# Patient Record
Sex: MALE | Race: OTHER | Employment: FULL TIME | URBAN - METROPOLITAN AREA
[De-identification: names, ages, dates, MRNs, and addresses within clinical notes are randomized per-mention and may not be internally consistent; named-entity substitution may affect disease eponyms.]

---

## 2024-03-07 ENCOUNTER — HOSPITAL ENCOUNTER (EMERGENCY)
Facility: HOSPITAL | Age: 24
Discharge: HOME | End: 2024-03-07
Attending: EMERGENCY MEDICINE

## 2024-03-07 VITALS
DIASTOLIC BLOOD PRESSURE: 74 MMHG | RESPIRATION RATE: 14 BRPM | HEART RATE: 69 BPM | TEMPERATURE: 99.8 F | SYSTOLIC BLOOD PRESSURE: 113 MMHG | OXYGEN SATURATION: 99 %

## 2024-03-07 DIAGNOSIS — F41.9 ANXIETY: Primary | ICD-10-CM

## 2024-03-07 DIAGNOSIS — R07.9 CHEST PAIN: ICD-10-CM

## 2024-03-07 LAB
ATRIAL RATE: 79 BPM
P AXIS: 85 DEGREES
PR INTERVAL: 152 MS
QRS AXIS: 71 DEGREES
QRSD INTERVAL: 90 MS
QT INTERVAL: 364 MS
QTC INTERVAL: 417 MS
T WAVE AXIS: 17 DEGREES
VENTRICULAR RATE: 79 BPM

## 2024-03-07 PROCEDURE — 93010 ELECTROCARDIOGRAM REPORT: CPT | Performed by: INTERNAL MEDICINE

## 2024-03-07 PROCEDURE — 93005 ELECTROCARDIOGRAM TRACING: CPT

## 2024-03-07 PROCEDURE — 99284 EMERGENCY DEPT VISIT MOD MDM: CPT

## 2024-03-07 RX ORDER — DIAZEPAM 5 MG/1
5 TABLET ORAL ONCE
Status: COMPLETED | OUTPATIENT
Start: 2024-03-07 | End: 2024-03-07

## 2024-03-07 RX ORDER — HYDROXYZINE HYDROCHLORIDE 25 MG/1
25 TABLET, FILM COATED ORAL EVERY 6 HOURS
Qty: 12 TABLET | Refills: 0 | Status: SHIPPED | OUTPATIENT
Start: 2024-03-07

## 2024-03-07 RX ADMIN — DIAZEPAM 5 MG: 5 TABLET ORAL at 19:40

## 2024-03-07 NOTE — Clinical Note
Babar Jacques was seen and treated in our emergency department on 3/7/2024.                Diagnosis:     Babar  .    He may return on this date: 03/08/2024         If you have any questions or concerns, please don't hesitate to call.      Jaun Hinkle MD    ______________________________           _______________          _______________  Hospital Representative                              Date                                Time

## 2024-03-08 NOTE — ED PROVIDER NOTES
History  Chief Complaint   Patient presents with    Chest Pain     3 days mid sternal no radiation.      This is a 23-year-old otherwise healthy male who is presenting today due to concern for episodes of chest pain.  Patient works as a  and states that last night he was having intermittent episodes of left-sided and sternal chest pain that would last for few seconds to a few minutes.  He denies any associated symptoms such as shortness of breath, nausea, or radiation of the pain into his left neck, left arm, or into his back.  He did not note any positional nature of the pain.  And denies any recent infections or fevers, chills, cough, congestion.  He denies any prior history of heart disease or pulmonary diagnoses.  Patient is an every day smoker.  No family history of early onset cardiac disease or sudden cardiac death.          None       History reviewed. No pertinent past medical history.    History reviewed. No pertinent surgical history.    History reviewed. No pertinent family history.  I have reviewed and agree with the history as documented.    E-Cigarette/Vaping     E-Cigarette/Vaping Substances     Social History     Tobacco Use    Smoking status: Every Day    Smokeless tobacco: Never        Review of Systems   Constitutional:  Negative for chills, fatigue and fever.   HENT:  Negative for congestion and sore throat.    Eyes:  Negative for pain and visual disturbance.   Respiratory:  Negative for cough, chest tightness and shortness of breath.    Cardiovascular:  Positive for chest pain. Negative for palpitations.   Gastrointestinal:  Negative for abdominal pain, blood in stool, constipation, diarrhea, nausea and vomiting.   Genitourinary:  Negative for dysuria, flank pain and hematuria.   Musculoskeletal:  Negative for arthralgias, back pain and neck pain.   Skin:  Negative for color change and rash.   Neurological:  Negative for dizziness, syncope and light-headedness.   Hematological:   Negative for adenopathy. Does not bruise/bleed easily.   All other systems reviewed and are negative.      Physical Exam  ED Triage Vitals   Temperature Pulse Respirations Blood Pressure SpO2   03/07/24 1850 03/07/24 1850 03/07/24 1850 03/07/24 1851 03/07/24 1850   99.8 °F (37.7 °C) 87 20 114/60 100 %      Temp Source Heart Rate Source Patient Position - Orthostatic VS BP Location FiO2 (%)   03/07/24 1850 03/07/24 1850 03/07/24 1915 03/07/24 1915 --   Temporal Monitor Lying Right arm       Pain Score       03/07/24 1850       5             Orthostatic Vital Signs  Vitals:    03/07/24 1850 03/07/24 1851 03/07/24 1915   BP:  114/60 113/74   Pulse: 87  69   Patient Position - Orthostatic VS:   Lying       Physical Exam  Vitals and nursing note reviewed.   Constitutional:       General: He is not in acute distress.     Appearance: He is well-developed. He is not toxic-appearing or diaphoretic.   HENT:      Head: Normocephalic and atraumatic.      Right Ear: External ear normal.      Left Ear: External ear normal.      Nose: Nose normal. No congestion or rhinorrhea.      Mouth/Throat:      Mouth: Mucous membranes are moist.      Pharynx: No oropharyngeal exudate or posterior oropharyngeal erythema.   Eyes:      General: No scleral icterus.     Extraocular Movements: Extraocular movements intact.      Conjunctiva/sclera: Conjunctivae normal.      Pupils: Pupils are equal, round, and reactive to light.   Cardiovascular:      Rate and Rhythm: Normal rate and regular rhythm.      Pulses: Normal pulses.      Heart sounds: No murmur heard.  Pulmonary:      Effort: Pulmonary effort is normal. No respiratory distress.      Breath sounds: Normal breath sounds. No wheezing or rales.   Abdominal:      Palpations: Abdomen is soft. There is no mass.      Tenderness: There is no abdominal tenderness. There is no right CVA tenderness, left CVA tenderness or guarding.      Hernia: No hernia is present.   Musculoskeletal:          General: No swelling. Normal range of motion.      Cervical back: Normal range of motion and neck supple.      Right lower leg: No edema.      Left lower leg: No edema.   Skin:     General: Skin is warm and dry.      Capillary Refill: Capillary refill takes less than 2 seconds.   Neurological:      General: No focal deficit present.      Mental Status: He is alert and oriented to person, place, and time.   Psychiatric:         Mood and Affect: Mood normal.         Behavior: Behavior normal.         ED Medications  Medications   diazepam (VALIUM) tablet 5 mg (5 mg Oral Given 3/7/24 1940)       Diagnostic Studies  Results Reviewed       None                   No orders to display         Procedures  Procedures    ECG interpreted by me.  Date: 3/7/2024.  Time: 1853.  Rate: 79 bpm.  Axis: Normal.  Rhythm: Regular.  There are no ST elevations or depressions evident, no repolarization abnormalities identified.  Interpretation: This is a normal ECG without ischemic concerns noted.     ED Course                                       Medical Decision Making  Medical complexity: This patient is 23 years old, is a smoker, however is otherwise healthy reportedly.  He has been experiencing some intermittent twinges of pain over the past few days.  Very low suspicion for this being anything cardiac related however will screen with an ECG to make sure it is within normal limits.  If normal, no further workup necessary for cardiac rule out which is why patient is here today.  Unclear etiology of patient's pain, may be musculoskeletal, but very low (though not 0) chance of it being arrhythmia or ACS.    Reassessment/disposition: ECG is within normal limits.  Patient will be discharged with PCP referral and information for follow-up.  However patient did begin to feel some significant anxiety after initial assessment.  I did treat the patient with a dose of Valium here in the emergency department and provided a referral for PCP and a  short-term prescription for hydroxyzine to be used at home as needed.    Risk  Prescription drug management.          Disposition  Final diagnoses:   Anxiety   Chest pain     Time reflects when diagnosis was documented in both MDM as applicable and the Disposition within this note       Time User Action Codes Description Comment    3/7/2024  7:36 PM Jaun Hinkle Add [F41.9] Anxiety     3/7/2024  7:37 PM Jaun Hinkle Add [R07.9] Chest pain           ED Disposition       ED Disposition   Discharge    Condition   Stable    Date/Time   Thu Mar 7, 2024  7:37 PM    Comment   Babar Jacques discharge to home/self care.                   Follow-up Information       Follow up With Specialties Details Why Contact Info Additional Information    [facility] Emergency Department Emergency Medicine Go to  If symptoms worsen, As needed [address] [facility] [address] [phone] [facility] Emergency Department, [address], [place], [address] [phone]    [facility] Medicine  Call to setup appt with PCP [address] [facility] [address] [phone] [facility], [address] [place] [address], [address] [phone]            Discharge Medication List as of 3/7/2024  7:38 PM        START taking these medications    Details   hydrOXYzine HCL (ATARAX) 25 mg tablet Take 1 tablet (25 mg total) by mouth every 6 (six) hours, Starting Thu 3/7/2024, Print               PDMP Review       None             ED Provider  Attending physically available and evaluated Babar Jacques. I managed the patient along with the ED Attending.    Electronically Signed by           Jaun Hinkle MD  03/07/24 4055

## 2024-03-10 LAB
ATRIAL RATE: 69 BPM
P AXIS: 76 DEGREES
PR INTERVAL: 152 MS
QRS AXIS: 82 DEGREES
QRSD INTERVAL: 88 MS
QT INTERVAL: 380 MS
QTC INTERVAL: 407 MS
T WAVE AXIS: 64 DEGREES
VENTRICULAR RATE: 69 BPM

## 2024-03-10 PROCEDURE — 93010 ELECTROCARDIOGRAM REPORT: CPT | Performed by: INTERNAL MEDICINE

## 2024-06-07 ENCOUNTER — HOSPITAL ENCOUNTER (EMERGENCY)
Facility: HOSPITAL | Age: 24
Discharge: HOME | End: 2024-06-07
Attending: EMERGENCY MEDICINE

## 2024-06-07 VITALS
HEART RATE: 75 BPM | OXYGEN SATURATION: 97 % | DIASTOLIC BLOOD PRESSURE: 78 MMHG | TEMPERATURE: 98.2 F | RESPIRATION RATE: 16 BRPM | SYSTOLIC BLOOD PRESSURE: 128 MMHG

## 2024-06-07 DIAGNOSIS — R07.89 ATYPICAL CHEST PAIN: Primary | ICD-10-CM

## 2024-06-07 LAB
ATRIAL RATE: 76 BPM
P AXIS: 68 DEGREES
PR INTERVAL: 146 MS
QRS AXIS: 71 DEGREES
QRSD INTERVAL: 86 MS
QT INTERVAL: 358 MS
QTC INTERVAL: 402 MS
T WAVE AXIS: 35 DEGREES
VENTRICULAR RATE: 76 BPM

## 2024-06-07 PROCEDURE — 99284 EMERGENCY DEPT VISIT MOD MDM: CPT | Performed by: EMERGENCY MEDICINE

## 2024-06-07 PROCEDURE — 93005 ELECTROCARDIOGRAM TRACING: CPT

## 2024-06-07 PROCEDURE — 93010 ELECTROCARDIOGRAM REPORT: CPT | Performed by: INTERNAL MEDICINE

## 2024-06-07 NOTE — DISCHARGE INSTRUCTIONS
If you have new, worse chest pain, if you are experiencing shortness of breath, if you have any new severe symptoms please come back to the emergency department for evaluation.  Otherwise please set up an appointment with a primary care physician.     As we discussed, for your pain you use Tylenol/Motrin, heat, and try to rest your left arm when able.

## 2024-06-07 NOTE — Clinical Note
Babar Jacques was seen and treated in our emergency department on 6/7/2024.                Diagnosis:     Babar  is off the rest of the shift today.    He may return on this date:          If you have any questions or concerns, please don't hesitate to call.      Jaun Hinkle MD    ______________________________           _______________          _______________  Hospital Representative                              Date                                Time

## 2024-06-07 NOTE — ED PROVIDER NOTES
History  Chief Complaint   Patient presents with    Chest Pain     Pt c/o left sided chest pain radiating into shoulder for 2 days. Pain described as sharp     This is a 23-year-old male who is otherwise healthy but presenting today with about 3 days worth of left shoulder/chest pain.  Patient reports that the pain is made worse whenever he is lifting his left arm above his head and whenever he is pushing over the area of the left shoulder sulcus.  He does note some radiation of the pain into his left upper arm.  He denies any numbness, tingling in his hand, is not dropping objects, and denies any shortness of breath, nausea, diaphoresis, history of heart disease, history of pulmonary disease, or early onset cardiac death in his family.  Patient denies any smoking history however does vape daily.  He denies any other major medical problems including any hypertension, hyperlipidemia, obesity.  He is denying any recent upper respiratory type infectious symptoms including any chills, night sweats, or fevers.        Prior to Admission Medications   Prescriptions Last Dose Informant Patient Reported? Taking?   hydrOXYzine HCL (ATARAX) 25 mg tablet   No No   Sig: Take 1 tablet (25 mg total) by mouth every 6 (six) hours      Facility-Administered Medications: None       History reviewed. No pertinent past medical history.    History reviewed. No pertinent surgical history.    History reviewed. No pertinent family history.  I have reviewed and agree with the history as documented.    E-Cigarette/Vaping     E-Cigarette/Vaping Substances     Social History     Tobacco Use    Smoking status: Every Day    Smokeless tobacco: Never        Review of Systems   Constitutional:  Negative for chills, fatigue and fever.   HENT:  Negative for congestion and sore throat.    Eyes:  Negative for pain and visual disturbance.   Respiratory:  Negative for cough, chest tightness and shortness of breath.    Cardiovascular:  Positive for chest  pain. Negative for palpitations.   Gastrointestinal:  Negative for abdominal pain, blood in stool, constipation, diarrhea, nausea and vomiting.   Genitourinary:  Negative for dysuria, flank pain and hematuria.   Musculoskeletal:  Negative for arthralgias, back pain and neck pain.   Skin:  Negative for color change and rash.   Neurological:  Negative for dizziness, syncope and light-headedness.   Hematological:  Negative for adenopathy. Does not bruise/bleed easily.   All other systems reviewed and are negative.      Physical Exam  ED Triage Vitals [06/07/24 0316]   Temperature Pulse Respirations Blood Pressure SpO2   98.2 °F (36.8 °C) 75 16 128/78 97 %      Temp Source Heart Rate Source Patient Position - Orthostatic VS BP Location FiO2 (%)   Temporal Monitor Sitting Left arm --      Pain Score       --             Orthostatic Vital Signs  Vitals:    06/07/24 0316   BP: 128/78   Pulse: 75   Patient Position - Orthostatic VS: Sitting       Physical Exam  Vitals and nursing note reviewed.   Constitutional:       General: He is not in acute distress.     Appearance: He is well-developed and normal weight. He is not toxic-appearing or diaphoretic.   HENT:      Head: Normocephalic and atraumatic.      Right Ear: External ear normal.      Left Ear: External ear normal.      Nose: Nose normal. No congestion or rhinorrhea.      Mouth/Throat:      Mouth: Mucous membranes are moist.      Pharynx: No oropharyngeal exudate or posterior oropharyngeal erythema.   Eyes:      General: No scleral icterus.     Extraocular Movements: Extraocular movements intact.      Conjunctiva/sclera: Conjunctivae normal.      Pupils: Pupils are equal, round, and reactive to light.   Cardiovascular:      Rate and Rhythm: Normal rate and regular rhythm.      Pulses: Normal pulses.      Heart sounds: No murmur heard.  Pulmonary:      Effort: Pulmonary effort is normal. No respiratory distress.      Breath sounds: Normal breath sounds. No wheezing or  rales.   Abdominal:      Palpations: Abdomen is soft. There is no mass.      Tenderness: There is no abdominal tenderness. There is no right CVA tenderness, left CVA tenderness or guarding.      Hernia: No hernia is present.   Musculoskeletal:         General: No swelling. Normal range of motion.      Cervical back: Normal range of motion and neck supple.      Right lower leg: No edema.      Left lower leg: No edema.   Skin:     General: Skin is warm and dry.      Capillary Refill: Capillary refill takes less than 2 seconds.   Neurological:      General: No focal deficit present.      Mental Status: He is alert and oriented to person, place, and time.   Psychiatric:         Mood and Affect: Mood normal.         Behavior: Behavior normal.         ED Medications  Medications - No data to display    Diagnostic Studies  Results Reviewed       None                   No orders to display         Procedures  Procedures      ED Course  ED Course as of 06/07/24 0340   Fri Jun 07, 2024   0320 ECG interpreted by me.  Date: 6/7/2024.  Time: 0319.  Rate: 76 bpm.  Axis: Normal.  Rhythm: Regular.  Interpretation: This is a normal ECG with no repolarization abnormalities identified, no ST elevations or depressions noted.  Similar to prior from 3/7/2024.                     PERC Rule for PE      Flowsheet Row Most Recent Value   PERC Rule for PE    Age >=50 0 Filed at: 06/07/2024 0322   HR >=100 0 Filed at: 06/07/2024 0322   O2 Sat on room air < 95% 0 Filed at: 06/07/2024 0322   History of PE or DVT 0 Filed at: 06/07/2024 0322   Recent trauma or surgery 0 Filed at: 06/07/2024 0322   Hemoptysis 0 Filed at: 06/07/2024 0322   Exogenous estrogen 0 Filed at: 06/07/2024 0322   Unilateral leg swelling 0 Filed at: 06/07/2024 0322   PERC Rule for PE Results 0 Filed at: 06/07/2024 0322                SBIRT 22yo+      Flowsheet Row Most Recent Value   Initial Alcohol Screen: US AUDIT-C     1. How often do you have a drink containing alcohol? 0  Filed at: 06/07/2024 0317   2. How many drinks containing alcohol do you have on a typical day you are drinking?  0 Filed at: 06/07/2024 0317   3a. Male UNDER 65: How often do you have five or more drinks on one occasion? 0 Filed at: 06/07/2024 0317   Audit-C Score 0 Filed at: 06/07/2024 0317   MARILYN: How many times in the past year have you...    Used an illegal drug or used a prescription medication for non-medical reasons? Never Filed at: 06/07/2024 0317            Wells' Criteria for PE      Flowsheet Row Most Recent Value   Wells' Criteria for PE    Clinical signs and symptoms of DVT 0 Filed at: 06/07/2024 0322   PE is primary diagnosis or equally likely 0 Filed at: 06/07/2024 0322   HR >100 0 Filed at: 06/07/2024 0322   Immobilization at least 3 days or Surgery in the previous 4 weeks 0 Filed at: 06/07/2024 0322   Previous, objectively diagnosed PE or DVT 0 Filed at: 06/07/2024 0322   Hemoptysis 0 Filed at: 06/07/2024 0322   Malignancy with treatment within 6 months or palliative 0 Filed at: 06/07/2024 0322   Wells' Criteria Total 0 Filed at: 06/07/2024 0322              Medical Decision Making  Patient presenting with several days of ongoing symptoms of the left shoulder/chest pain.  This is musculoskeletal pain in the sense that it is very reproducible, it is made worse with certain arm movements and he is otherwise healthy without risk factors for cardiovascular disease.  His EKG during triage is normal with only evidence of mild early repolarization.  Otherwise denying red flags of intrathoracic cause of his symptoms.  I advised NSAIDs as best treatment for his symptoms as well as rest, topical therapies, and muscle massage.  Otherwise, patient should follow-up with her primary doctor as he does not have one at this time.  He was discharged with normal vital signs, stable examination, and instructions for pain control as above.    He was told if he has new or worsening chest pain he should come immediately  back to the emergency department.            Disposition  Final diagnoses:   Atypical chest pain     Time reflects when diagnosis was documented in both MDM as applicable and the Disposition within this note       Time User Action Codes Description Comment    6/7/2024  3:32 AM Jaun Hinkle Add [R07.89] Atypical chest pain           ED Disposition       ED Disposition   Discharge    Condition   Stable    Date/Time   Fri Jun 7, 2024 0332    Comment   Babar Jacques discharge to home/self care.                   Follow-up Information       Follow up With Specialties Details Why Contact Info Additional Information    [facility] Emergency Department Emergency Medicine Go to  If symptoms worsen, As needed [address] [facility] [address] [phone] [facility] Emergency Department, [address] [place], [place], [address]   [phone]     [facility]  Schedule an appointment as soon as possible for a visit  Call to find a [facility] PCP [phone]             Patient's Medications   Discharge Prescriptions    No medications on file     No discharge procedures on file.    PDMP Review       None             ED Provider  Attending physically available and evaluated Babar Mg Jacques. I managed the patient along with the ED Attending.    Electronically Signed by           Jaun Hinkle MD  06/07/24 5654

## 2024-06-08 NOTE — ED ATTENDING ATTESTATION
6/7/2024  I, James Fairbanks MD, saw and evaluated the patient. I have discussed the patient with the resident/non-physician practitioner and agree with the resident's/non-physician practitioner's findings, Plan of Care, and MDM as documented in the resident's/non-physician practitioner's note, except where noted. All available labs and Radiology studies were reviewed.  I was present for key portions of any procedure(s) performed by the resident/non-physician practitioner and I was immediately available to provide assistance.       At this point I agree with the current assessment done in the Emergency Department.  I have conducted an independent evaluation of this patient a history and physical is as follows:    23-year-old male presents to the emergency department for evaluation of left shoulder/chest wall pain.  Pain has been ongoing for the past few days.  Denies any specific trauma, falls, or injury.  Is left-hand dominant.  No fevers or chills.  No shortness of breath.  No diaphoresis, nausea or vomiting.  No prior history of DVT or PE.    On exam, he is rest comfortably in bed in no acute distress, is normocephalic atraumatic, pupils equal round reactive, neck is supple without meningismus signs, heart is regular rate and rhythm with intact distal pulses, no increased work of breathing, respiratory distress, or stridor.  Patient has tenderness to palpation where the pec inserts into the shoulder.  No overlying skin changes.  No pain out of proportion.    Suspect symptoms likely musculoskeletal in nature.  Will get EKG to evaluate for arrhythmia.  Patient is low risk for PE according to Wells criteria and is PERC negative.    ED Course         Critical Care Time  Procedures

## 2025-08-10 ENCOUNTER — HOSPITAL ENCOUNTER (EMERGENCY)
Facility: HOSPITAL | Age: 25
Discharge: HOME | End: 2025-08-10
Attending: EMERGENCY MEDICINE | Admitting: EMERGENCY MEDICINE
Payer: COMMERCIAL

## 2025-08-10 ENCOUNTER — APPOINTMENT (EMERGENCY)
Dept: RADIOLOGY | Facility: HOSPITAL | Age: 25
End: 2025-08-10
Payer: COMMERCIAL